# Patient Record
Sex: FEMALE | Race: WHITE | NOT HISPANIC OR LATINO | Employment: FULL TIME | URBAN - METROPOLITAN AREA
[De-identification: names, ages, dates, MRNs, and addresses within clinical notes are randomized per-mention and may not be internally consistent; named-entity substitution may affect disease eponyms.]

---

## 2019-02-13 ENCOUNTER — RECORDS - HEALTHEAST (OUTPATIENT)
Dept: LAB | Facility: CLINIC | Age: 59
End: 2019-02-13

## 2019-02-13 LAB
ANION GAP SERPL CALCULATED.3IONS-SCNC: 11 MMOL/L (ref 5–18)
BUN SERPL-MCNC: 13 MG/DL (ref 8–22)
CALCIUM SERPL-MCNC: 10.8 MG/DL (ref 8.5–10.5)
CHLORIDE BLD-SCNC: 100 MMOL/L (ref 98–107)
CHOLEST SERPL-MCNC: 169 MG/DL
CO2 SERPL-SCNC: 29 MMOL/L (ref 22–31)
CREAT SERPL-MCNC: 0.76 MG/DL (ref 0.6–1.1)
CREAT UR-MCNC: 39.4 MG/DL
FASTING STATUS PATIENT QL REPORTED: YES
GFR SERPL CREATININE-BSD FRML MDRD: >60 ML/MIN/1.73M2
GLUCOSE BLD-MCNC: 147 MG/DL (ref 70–125)
HDLC SERPL-MCNC: 50 MG/DL
LDLC SERPL CALC-MCNC: 88 MG/DL
MICROALBUMIN UR-MCNC: 10.5 MG/DL (ref 0–1.99)
MICROALBUMIN/CREAT UR: 266.5 MG/G
POTASSIUM BLD-SCNC: 4.5 MMOL/L (ref 3.5–5)
SODIUM SERPL-SCNC: 140 MMOL/L (ref 136–145)
TRIGL SERPL-MCNC: 154 MG/DL

## 2019-02-14 LAB
HPV SOURCE: NORMAL
HUMAN PAPILLOMA VIRUS 16 DNA: NEGATIVE
HUMAN PAPILLOMA VIRUS 18 DNA: NEGATIVE
HUMAN PAPILLOMA VIRUS FINAL DIAGNOSIS: NORMAL
HUMAN PAPILLOMA VIRUS OTHER HR: NEGATIVE
SPECIMEN DESCRIPTION: NORMAL

## 2019-04-26 ENCOUNTER — RECORDS - HEALTHEAST (OUTPATIENT)
Dept: LAB | Facility: CLINIC | Age: 59
End: 2019-04-26

## 2019-04-26 LAB
CREAT UR-MCNC: 61.5 MG/DL
MICROALBUMIN UR-MCNC: 5.33 MG/DL (ref 0–1.99)
MICROALBUMIN/CREAT UR: 86.7 MG/G

## 2020-04-01 ENCOUNTER — RECORDS - HEALTHEAST (OUTPATIENT)
Dept: LAB | Facility: CLINIC | Age: 60
End: 2020-04-01

## 2020-04-01 LAB
ANION GAP SERPL CALCULATED.3IONS-SCNC: 13 MMOL/L (ref 5–18)
BUN SERPL-MCNC: 15 MG/DL (ref 8–22)
CALCIUM SERPL-MCNC: 9.5 MG/DL (ref 8.5–10.5)
CHLORIDE BLD-SCNC: 103 MMOL/L (ref 98–107)
CHOLEST SERPL-MCNC: 141 MG/DL
CO2 SERPL-SCNC: 24 MMOL/L (ref 22–31)
CREAT SERPL-MCNC: 0.81 MG/DL (ref 0.6–1.1)
FASTING STATUS PATIENT QL REPORTED: YES
GFR SERPL CREATININE-BSD FRML MDRD: >60 ML/MIN/1.73M2
GLUCOSE BLD-MCNC: 163 MG/DL (ref 70–125)
HDLC SERPL-MCNC: 38 MG/DL
LDLC SERPL CALC-MCNC: 63 MG/DL
POTASSIUM BLD-SCNC: 4.8 MMOL/L (ref 3.5–5)
SODIUM SERPL-SCNC: 140 MMOL/L (ref 136–145)
TRIGL SERPL-MCNC: 201 MG/DL

## 2021-05-26 ENCOUNTER — RECORDS - HEALTHEAST (OUTPATIENT)
Dept: ADMINISTRATIVE | Facility: CLINIC | Age: 61
End: 2021-05-26

## 2021-05-28 ENCOUNTER — RECORDS - HEALTHEAST (OUTPATIENT)
Dept: ADMINISTRATIVE | Facility: CLINIC | Age: 61
End: 2021-05-28

## 2021-08-23 ENCOUNTER — LAB REQUISITION (OUTPATIENT)
Dept: LAB | Facility: CLINIC | Age: 61
End: 2021-08-23
Payer: COMMERCIAL

## 2021-08-23 DIAGNOSIS — Z20.822 CONTACT WITH AND (SUSPECTED) EXPOSURE TO COVID-19: ICD-10-CM

## 2021-08-23 PROCEDURE — U0003 INFECTIOUS AGENT DETECTION BY NUCLEIC ACID (DNA OR RNA); SEVERE ACUTE RESPIRATORY SYNDROME CORONAVIRUS 2 (SARS-COV-2) (CORONAVIRUS DISEASE [COVID-19]), AMPLIFIED PROBE TECHNIQUE, MAKING USE OF HIGH THROUGHPUT TECHNOLOGIES AS DESCRIBED BY CMS-2020-01-R: HCPCS | Mod: ORL | Performed by: PHYSICIAN ASSISTANT

## 2021-08-24 LAB — SARS-COV-2 RNA RESP QL NAA+PROBE: NEGATIVE

## 2021-11-03 ENCOUNTER — LAB REQUISITION (OUTPATIENT)
Dept: LAB | Facility: CLINIC | Age: 61
End: 2021-11-03
Payer: COMMERCIAL

## 2021-11-03 DIAGNOSIS — E78.5 HYPERLIPIDEMIA, UNSPECIFIED: ICD-10-CM

## 2021-11-03 DIAGNOSIS — I10 ESSENTIAL (PRIMARY) HYPERTENSION: ICD-10-CM

## 2021-11-03 LAB
ANION GAP SERPL CALCULATED.3IONS-SCNC: 12 MMOL/L (ref 5–18)
BUN SERPL-MCNC: 10 MG/DL (ref 8–22)
CALCIUM SERPL-MCNC: 9.6 MG/DL (ref 8.5–10.5)
CHLORIDE BLD-SCNC: 102 MMOL/L (ref 98–107)
CHOLEST SERPL-MCNC: 136 MG/DL
CO2 SERPL-SCNC: 26 MMOL/L (ref 22–31)
CREAT SERPL-MCNC: 0.77 MG/DL (ref 0.6–1.1)
GFR SERPL CREATININE-BSD FRML MDRD: 85 ML/MIN/1.73M2
GLUCOSE BLD-MCNC: 141 MG/DL (ref 70–125)
HDLC SERPL-MCNC: 40 MG/DL
LDLC SERPL CALC-MCNC: 56 MG/DL
POTASSIUM BLD-SCNC: 4.3 MMOL/L (ref 3.5–5)
SODIUM SERPL-SCNC: 140 MMOL/L (ref 136–145)
TRIGL SERPL-MCNC: 198 MG/DL

## 2021-11-03 PROCEDURE — 80048 BASIC METABOLIC PNL TOTAL CA: CPT | Mod: ORL | Performed by: FAMILY MEDICINE

## 2021-11-03 PROCEDURE — 80061 LIPID PANEL: CPT | Mod: ORL | Performed by: FAMILY MEDICINE

## 2022-03-03 ENCOUNTER — LAB REQUISITION (OUTPATIENT)
Dept: LAB | Facility: CLINIC | Age: 62
End: 2022-03-03
Payer: COMMERCIAL

## 2022-03-03 DIAGNOSIS — Z01.818 ENCOUNTER FOR OTHER PREPROCEDURAL EXAMINATION: ICD-10-CM

## 2022-03-03 LAB
ANION GAP SERPL CALCULATED.3IONS-SCNC: 13 MMOL/L (ref 5–18)
BUN SERPL-MCNC: 9 MG/DL (ref 8–22)
CALCIUM SERPL-MCNC: 9.4 MG/DL (ref 8.5–10.5)
CHLORIDE BLD-SCNC: 103 MMOL/L (ref 98–107)
CO2 SERPL-SCNC: 24 MMOL/L (ref 22–31)
CREAT SERPL-MCNC: 0.77 MG/DL (ref 0.6–1.1)
GFR SERPL CREATININE-BSD FRML MDRD: 88 ML/MIN/1.73M2
GLUCOSE BLD-MCNC: 178 MG/DL (ref 70–125)
POTASSIUM BLD-SCNC: 4.5 MMOL/L (ref 3.5–5)
SODIUM SERPL-SCNC: 140 MMOL/L (ref 136–145)

## 2022-03-03 PROCEDURE — 80048 BASIC METABOLIC PNL TOTAL CA: CPT | Mod: ORL | Performed by: FAMILY MEDICINE

## 2023-02-20 ENCOUNTER — LAB REQUISITION (OUTPATIENT)
Dept: LAB | Facility: CLINIC | Age: 63
End: 2023-02-20

## 2023-02-20 DIAGNOSIS — E78.5 HYPERLIPIDEMIA, UNSPECIFIED: ICD-10-CM

## 2023-02-20 DIAGNOSIS — I10 ESSENTIAL (PRIMARY) HYPERTENSION: ICD-10-CM

## 2023-02-20 LAB
ANION GAP SERPL CALCULATED.3IONS-SCNC: 17 MMOL/L (ref 7–15)
BUN SERPL-MCNC: 15.7 MG/DL (ref 8–23)
CALCIUM SERPL-MCNC: 9.7 MG/DL (ref 8.8–10.2)
CHLORIDE SERPL-SCNC: 100 MMOL/L (ref 98–107)
CHOLEST SERPL-MCNC: 152 MG/DL
CREAT SERPL-MCNC: 0.75 MG/DL (ref 0.51–0.95)
DEPRECATED HCO3 PLAS-SCNC: 22 MMOL/L (ref 22–29)
GFR SERPL CREATININE-BSD FRML MDRD: >90 ML/MIN/1.73M2
GLUCOSE SERPL-MCNC: 158 MG/DL (ref 70–99)
HDLC SERPL-MCNC: 41 MG/DL
LDLC SERPL CALC-MCNC: 65 MG/DL
NONHDLC SERPL-MCNC: 111 MG/DL
POTASSIUM SERPL-SCNC: 4.6 MMOL/L (ref 3.4–5.3)
SODIUM SERPL-SCNC: 139 MMOL/L (ref 136–145)
TRIGL SERPL-MCNC: 229 MG/DL

## 2023-02-20 PROCEDURE — 80048 BASIC METABOLIC PNL TOTAL CA: CPT | Mod: ORL | Performed by: STUDENT IN AN ORGANIZED HEALTH CARE EDUCATION/TRAINING PROGRAM

## 2023-02-20 PROCEDURE — 80061 LIPID PANEL: CPT | Mod: ORL | Performed by: STUDENT IN AN ORGANIZED HEALTH CARE EDUCATION/TRAINING PROGRAM

## 2023-03-03 ENCOUNTER — OFFICE VISIT (OUTPATIENT)
Dept: URGENT CARE | Facility: URGENT CARE | Age: 63
End: 2023-03-03
Payer: COMMERCIAL

## 2023-03-03 VITALS
DIASTOLIC BLOOD PRESSURE: 93 MMHG | BODY MASS INDEX: 29.18 KG/M2 | RESPIRATION RATE: 17 BRPM | SYSTOLIC BLOOD PRESSURE: 137 MMHG | WEIGHT: 170 LBS | OXYGEN SATURATION: 95 % | TEMPERATURE: 97.8 F | HEART RATE: 101 BPM

## 2023-03-03 DIAGNOSIS — R11.2 NAUSEA AND VOMITING, UNSPECIFIED VOMITING TYPE: Primary | ICD-10-CM

## 2023-03-03 PROCEDURE — 99203 OFFICE O/P NEW LOW 30 MIN: CPT | Performed by: EMERGENCY MEDICINE

## 2023-03-03 RX ORDER — LISINOPRIL 40 MG/1
40 TABLET ORAL DAILY
COMMUNITY
Start: 2022-05-21

## 2023-03-03 RX ORDER — VENLAFAXINE 37.5 MG/1
3 TABLET ORAL DAILY
COMMUNITY
Start: 2022-06-13

## 2023-03-03 RX ORDER — ONDANSETRON 4 MG/1
4 TABLET, ORALLY DISINTEGRATING ORAL ONCE
Status: COMPLETED | OUTPATIENT
Start: 2023-03-03 | End: 2023-03-03

## 2023-03-03 RX ORDER — METFORMIN HCL 500 MG
2000 TABLET, EXTENDED RELEASE 24 HR ORAL
COMMUNITY

## 2023-03-03 RX ORDER — ONDANSETRON 4 MG/1
4 TABLET, ORALLY DISINTEGRATING ORAL EVERY 12 HOURS PRN
Qty: 12 TABLET | Refills: 0 | Status: SHIPPED | OUTPATIENT
Start: 2023-03-03 | End: 2024-05-01

## 2023-03-03 RX ORDER — ATORVASTATIN CALCIUM 80 MG/1
80 TABLET, FILM COATED ORAL
COMMUNITY
Start: 2022-06-21 | End: 2024-06-14

## 2023-03-03 RX ADMIN — ONDANSETRON 4 MG: 4 TABLET, ORALLY DISINTEGRATING ORAL at 17:12

## 2023-03-03 NOTE — PROGRESS NOTES
Assessment & Plan     Diagnosis:  (R11.2) Nausea and vomiting, unspecified vomiting type  (primary encounter diagnosis)  Plan: ondansetron (ZOFRAN ODT) ODT tab 4 mg      Medical Decision Making:  Sonia Montana is a 62 year old female who presents for evaluation of nausea and vomiting following use of Hydrocodone yesterday for post-operative pain (patient had a bilateral blepharoplasty).  Patient has not vomited for about two hours at this juncture and was given Zofran here to help prevent recurrent emesis.  No dark or bloody stools.  She has completely benign abdominal exam and surgical incisions appear to be well-healing with no drainage or signs of infection.  Suspect opiate induced nausea and vomiting, discussed avoiding this medication if she can tolerate the pain with Tylenol/Ibuprofen and use Zofran as needed for nausea.  Go to the ER for intractable vomiting, severe chest/abdominal pain, fevers, new vision changes or other concerns.    Patient voices understanding and agreement with the plan including reasons to go to the ER immediately as well as to be seen by a more consistent care-giver, such as their PCP, if the symptoms persist more than 3 days.     ABIMAEL Victor [facility] URGENT CARE    Subjective     Sonia Montana is a 62 year old female who presents to clinic today for the following health issues:  Chief Complaint   Patient presents with     Urgent Care     Pt had surgery in eyes, took hydrocodone     Vomiting     Pt has thrown up 4/5 times since this morning, last time was about an hour ago       HPI  Patient notes that she had a blepharoplasty yesterday and after taking hydrocodone 2 times yesterday she had 4-5 episodes of vomiting, was still feeling nauseated this morning even though her last dose was last night.  She has not thrown up for the last couple of hours, but states she does feel intermittently feels nauseated and feels like she throws up quickly after the nausea comes  on.  Denies any black or bloody emesis, diarrhea, abdominal pain, chest pain, shortness of breath, double vision, fevers or other concerns.      Review of Systems    See HPI    Objective      Vitals: BP (!) 137/93 (BP Location: Right arm, Patient Position: Sitting, Cuff Size: Adult Regular)   Pulse 101   Temp 97.8  F (36.6  C) (Temporal)   Resp 17   Wt 77.1 kg (170 lb)   SpO2 95%   BMI 29.18 kg/m      Patient Vitals for the past 24 hrs:   BP Temp Temp src Pulse Resp SpO2 Weight   03/03/23 1703 (!) 137/93 97.8  F (36.6  C) Temporal 101 17 95 % 77.1 kg (170 lb)       Vital signs reviewed by: Tye Wesley PA-C    Physical Exam   Constitutional: Patient is alert and cooperative. No acute distress.  Mouth: Mucous membranes are moist. Normal tongue and tonsil. Posterior oropharynx is clear.  Eyes: Conjunctivae, EOMI are normal. PERRL. Periorbital ecchymosis and swelling to the upper eyelids with scant dried blood across the tops.  Incisions appear well-healing.  No erythema, warmth or purulent drainage noted.  Cardiovascular: Regular rate and rhythm  Pulmonary/Chest: Lungs are clear to auscultation throughout. Effort normal. No respiratory distress. No wheezes, rales or rhonchi.  GI: Abdomen is soft and non-tender throughout. No CVA tenderness bilaterally.  Neurological: Alert and oriented x3.   Skin: No rash noted on visualized skin.  Psychiatric:The patient has a normal mood and affect.       Interventions:  Zofran 4 mg PO    Tye Wesley PA-C, March 3, 2023

## 2024-03-29 ENCOUNTER — LAB REQUISITION (OUTPATIENT)
Dept: LAB | Facility: CLINIC | Age: 64
End: 2024-03-29
Payer: COMMERCIAL

## 2024-03-29 DIAGNOSIS — E11.9 TYPE 2 DIABETES MELLITUS WITHOUT COMPLICATIONS (H): ICD-10-CM

## 2024-03-29 DIAGNOSIS — Z12.4 ENCOUNTER FOR SCREENING FOR MALIGNANT NEOPLASM OF CERVIX: ICD-10-CM

## 2024-03-29 DIAGNOSIS — I10 ESSENTIAL (PRIMARY) HYPERTENSION: ICD-10-CM

## 2024-03-29 DIAGNOSIS — E78.5 HYPERLIPIDEMIA, UNSPECIFIED: ICD-10-CM

## 2024-03-29 LAB
ANION GAP SERPL CALCULATED.3IONS-SCNC: 17 MMOL/L (ref 7–15)
BUN SERPL-MCNC: 11.1 MG/DL (ref 8–23)
CALCIUM SERPL-MCNC: 9.7 MG/DL (ref 8.8–10.2)
CHLORIDE SERPL-SCNC: 101 MMOL/L (ref 98–107)
CHOLEST SERPL-MCNC: 146 MG/DL
CREAT SERPL-MCNC: 0.72 MG/DL (ref 0.51–0.95)
CREAT UR-MCNC: 48.2 MG/DL
DEPRECATED HCO3 PLAS-SCNC: 21 MMOL/L (ref 22–29)
EGFRCR SERPLBLD CKD-EPI 2021: >90 ML/MIN/1.73M2
FASTING STATUS PATIENT QL REPORTED: ABNORMAL
GLUCOSE SERPL-MCNC: 156 MG/DL (ref 70–99)
HDLC SERPL-MCNC: 41 MG/DL
LDLC SERPL CALC-MCNC: 62 MG/DL
MICROALBUMIN UR-MCNC: 12.6 MG/L
MICROALBUMIN/CREAT UR: 26.14 MG/G CR (ref 0–25)
NONHDLC SERPL-MCNC: 105 MG/DL
POTASSIUM SERPL-SCNC: 4.6 MMOL/L (ref 3.4–5.3)
SODIUM SERPL-SCNC: 139 MMOL/L (ref 135–145)
TRIGL SERPL-MCNC: 213 MG/DL

## 2024-03-29 PROCEDURE — 80061 LIPID PANEL: CPT | Mod: ORL | Performed by: FAMILY MEDICINE

## 2024-03-29 PROCEDURE — 87624 HPV HI-RISK TYP POOLED RSLT: CPT | Mod: ORL | Performed by: FAMILY MEDICINE

## 2024-03-29 PROCEDURE — 82570 ASSAY OF URINE CREATININE: CPT | Mod: ORL | Performed by: FAMILY MEDICINE

## 2024-03-29 PROCEDURE — G0145 SCR C/V CYTO,THINLAYER,RESCR: HCPCS | Mod: ORL | Performed by: FAMILY MEDICINE

## 2024-03-29 PROCEDURE — 80048 BASIC METABOLIC PNL TOTAL CA: CPT | Mod: ORL | Performed by: FAMILY MEDICINE

## 2024-04-02 LAB
BKR LAB AP GYN ADEQUACY: NORMAL
BKR LAB AP GYN INTERPRETATION: NORMAL
BKR LAB AP HPV REFLEX: NORMAL
BKR LAB AP PREVIOUS ABNORMAL: NORMAL
PATH REPORT.COMMENTS IMP SPEC: NORMAL
PATH REPORT.COMMENTS IMP SPEC: NORMAL
PATH REPORT.RELEVANT HX SPEC: NORMAL

## 2024-04-04 LAB
HUMAN PAPILLOMA VIRUS 16 DNA: NEGATIVE
HUMAN PAPILLOMA VIRUS 18 DNA: NEGATIVE
HUMAN PAPILLOMA VIRUS FINAL DIAGNOSIS: NORMAL
HUMAN PAPILLOMA VIRUS OTHER HR: NEGATIVE

## 2024-04-30 NOTE — PROGRESS NOTES
Medication Therapy Management (MTM) Encounter    ASSESSMENT:                            Medication Adherence/Access: No issues identified. Timing of meds reviewed in detail.    Diabetes:   Patient is not meeting A1c goal of < 7%.  Self monitoring of blood glucose is not at goal of fasting  mg/dL.  Patient would benefit from additional therapy - GLP-1 RA for improved blood sugar control, weight loss, and cardiorenal benefits given hx of stroke. Additionally, reducing metformin to 4 whole tablets daily and eliminating the XR tablet cut in half may reduce GI side effects.     GERD    Stable.    Hypertension   Stable. Will need close monitoring with anticipated weight loss from Ozempic -- may be able to reduce or taper off amlodipine in the near future.    Hyperlipidemia   Stable.    Depression  Stable.      PLAN:                            1. Reduce metformin to 2 tablets twice a day. This will likely help to improve diarrhea.    2. Start Ozempic 0.25mg injected once per week. This medication helps your insulin to stay around longer to do its job, decreases appetite, and slows how you absorb sugar into your blood.  It is generally very effective in lowering blood sugar and helping with weight loss. There are some heart and kidney protective benefits long-term. Side effects we monitor for include nausea, vomiting, bloating, constipation or diarrhea, and abdominal pain.    3. I sent in a prescription for Freestyle Augie 3 sensors to the pharmacy. You will need to download the Freestyle Augie 3 lauri to your phone. This monitors blood sugar levels at all times.    4. You can take all of your medicines together in the evening for more convenience. We discussed leaving the venlfaxine as is, since it works well.      Follow-up: Return in 30 days (on 5/31/2024) for Medication dose check, in person, at 8am.    SUBJECTIVE/OBJECTIVE:                          Sonia Montana is a 63 year old female coming in for an initial  visit. She was referred to me from Sierra Leonardo.      Reason for visit: Medication review.    Allergies/ADRs: Reviewed in chart  Past Medical History: Reviewed in chart  Tobacco: She reports that she has never smoked. She has never used smokeless tobacco.  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 Later in the day will run 120-130mg/dL  Current diabetes symptoms: fatigue that comes and goes  Diet/Exercise: snacks on peanuts or crackers  Fruit and veggie and protein every day, yogurt for breakfast  Sugar has been higher over the last year  Cookie or something sweet after dinner most nights  Aims to exercise 3 days per week. But right now, 1 day per week, walks 4 miles with friends on Sundays       GERD    Omeprazole 20 mg 2 capsules daily    Patient reports no current symptoms.   Patient feels that current regimen is effective.  Notices symptoms if she misses a dose -- wakes her up in middle of night       Hypertension   Amlodipine 5mg twice daily  Lisinopril 40mg daily  Patient reports no current medication side effects  Patient self monitors blood pressure.  Home BP monitoring 130/76 .         Hyperlipidemia   Atorvastatin 80mg daily  Patient reports no significant myalgias or other side effects.       Recent Labs   Lab Test 03/29/24  1020 02/20/23  0835   CHOL 146 152   HDL 41* 41*   LDL 62 65   TRIG 213* 229*       Depression  Venlafaxine 37.5mg twice daily, 2 tablets in the morning and 1 in the evening.   Patient reports no current medication side effects.  Patient reports symptoms are stable. Feels this regimen works really well for her, doesn't want to change it.      Today's Vitals: /76   Pulse  106   Wt 170 lb 9.6 oz (77.4 kg)   BMI 29.28 kg/m    ----------------      I spent 60 minutes with this patient today. All changes were made via collaborative practice agreement with Sierra Leonardo MD. A copy of the visit note was provided to the patient's provider(s).    A summary of these recommendations was given to the patient.    Rabia Mejias, Pharm.D., [facility]  Medication Therapy Management Pharmacist  [phone]          Medication Therapy Recommendations  Type 2 diabetes mellitus without complication, without long-term current use of insulin (H)    Current Medication: metFORMIN (GLUCOPHAGE XR) 500 MG 24 hr tablet   Rationale: Undesirable effect - Adverse medication event - Safety   Recommendation: Decrease Dose   Status: Accepted per [facility]          Current Medication: semaglutide (OZEMPIC, 0.25 OR 0.5 MG/DOSE,) 2 MG/3ML pen   Rationale: Synergistic therapy - Needs additional medication therapy - Indication   Recommendation: Start Medication   Status: Accepted per CPA

## 2024-05-01 ENCOUNTER — OFFICE VISIT (OUTPATIENT)
Dept: PHARMACY | Facility: PHYSICIAN GROUP | Age: 64
End: 2024-05-01

## 2024-05-01 VITALS
HEART RATE: 106 BPM | BODY MASS INDEX: 29.28 KG/M2 | DIASTOLIC BLOOD PRESSURE: 76 MMHG | SYSTOLIC BLOOD PRESSURE: 119 MMHG | WEIGHT: 170.6 LBS

## 2024-05-01 DIAGNOSIS — K21.9 GASTROESOPHAGEAL REFLUX DISEASE, UNSPECIFIED WHETHER ESOPHAGITIS PRESENT: ICD-10-CM

## 2024-05-01 DIAGNOSIS — E78.5 HYPERLIPEMIA: ICD-10-CM

## 2024-05-01 DIAGNOSIS — E78.5 HYPERLIPIDEMIA, UNSPECIFIED HYPERLIPIDEMIA TYPE: ICD-10-CM

## 2024-05-01 DIAGNOSIS — F32.A DEPRESSION, UNSPECIFIED DEPRESSION TYPE: ICD-10-CM

## 2024-05-01 DIAGNOSIS — E11.9 TYPE 2 DIABETES MELLITUS WITHOUT COMPLICATION, WITHOUT LONG-TERM CURRENT USE OF INSULIN (H): Primary | ICD-10-CM

## 2024-05-01 DIAGNOSIS — I10 HYPERTENSION, ESSENTIAL: ICD-10-CM

## 2024-05-01 PROCEDURE — 99207 PR NO CHARGE LOS: CPT | Performed by: PHARMACIST

## 2024-05-01 RX ORDER — AMLODIPINE BESYLATE 5 MG/1
10 TABLET ORAL DAILY
COMMUNITY
End: 2024-09-04

## 2024-05-01 RX ORDER — SEMAGLUTIDE 0.68 MG/ML
0.5 INJECTION, SOLUTION SUBCUTANEOUS
COMMUNITY
End: 2024-08-09 | Stop reason: DRUGHIGH

## 2024-05-01 RX ORDER — ASPIRIN 325 MG
325 TABLET, DELAYED RELEASE (ENTERIC COATED) ORAL DAILY
COMMUNITY

## 2024-05-01 NOTE — PATIENT INSTRUCTIONS
Recommendations from today's MTM visit:                                                      1. Reduce metformin to 2 tablets twice a day. This will likely help to improve diarrhea.    2. Start Ozempic 0.25mg injected once per week. This medication helps your insulin to stay around longer to do its job, decreases appetite, and slows how you absorb sugar into your blood.  It is generally very effective in lowering blood sugar and helping with weight loss. There are some heart and kidney protective benefits long-term. Side effects we monitor for include nausea, vomiting, bloating, constipation or diarrhea, and abdominal pain.    3. I sent in a prescription for Freestyle Augie 3 sensors to the pharmacy. You will need to download the Freestyle Augie 3 lauri to your phone. This monitors blood sugar levels at all times.    4. You can take all of your medicines together in the evening for more convenience. We discussed leaving the venlfaxine as is, since it works well.      Follow-up: Return in 30 days (on 5/31/2024) for Medication dose check, in person, at 8am.    It was great to speak with you today.  I value your experience and would be very thankful for your time with providing feedback on our clinic survey. You may receive a survey via email or text message in the next few days.     To schedule another MTM appointment, please call the clinic directly or you may call the scheduling line at [phone].    My Clinical Pharmacist's contact information:                                                      Please feel free to contact me with any questions or concerns you have.      Rabia Mejias, Pharm.D., [facility]  Medication Therapy Management Pharmacist  [phone]

## 2024-06-14 ENCOUNTER — OFFICE VISIT (OUTPATIENT)
Dept: PHARMACY | Facility: PHYSICIAN GROUP | Age: 64
End: 2024-06-14
Payer: COMMERCIAL

## 2024-06-14 VITALS
BODY MASS INDEX: 28.29 KG/M2 | SYSTOLIC BLOOD PRESSURE: 118 MMHG | HEART RATE: 98 BPM | DIASTOLIC BLOOD PRESSURE: 76 MMHG | WEIGHT: 164.8 LBS

## 2024-06-14 DIAGNOSIS — E11.9 TYPE 2 DIABETES MELLITUS WITHOUT COMPLICATION, WITHOUT LONG-TERM CURRENT USE OF INSULIN (H): Primary | ICD-10-CM

## 2024-06-14 DIAGNOSIS — I10 HYPERTENSION, ESSENTIAL: ICD-10-CM

## 2024-06-14 DIAGNOSIS — E78.5 HYPERLIPIDEMIA, UNSPECIFIED HYPERLIPIDEMIA TYPE: ICD-10-CM

## 2024-06-14 PROCEDURE — 99207 PR NO CHARGE LOS: CPT | Performed by: PHARMACIST

## 2024-06-14 NOTE — PATIENT INSTRUCTIONS
Recommendations from today's MTM visit:                                                      ASSESSMENT:                            Medication Adherence/Access: No issues identified. Timing of meds reviewed in detail.    Diabetes:   Patient is not meeting A1c goal of < 7%.  Self monitoring of blood glucose is not at goal of fasting  mg/dL.  Patient would benefit from additional therapy - GLP-1 RA for improved blood sugar control, weight loss, and cardiorenal benefits given hx of stroke. Additionally, reducing metformin to 4 whole tablets daily and eliminating the XR tablet cut in half may reduce GI side effects.    Hypertension:   Stable. Patient is meeting blood pressure goal of < 130/80mmHg.    Hyperlipidemia:   Stable.      PLAN:                            1. Double check low blood sugar levels middle of night with a finger poke. If it is truly low, we should consider reducing your metformin dose.    2. Continue on Ozempic 0.5mg injected weekly for the next 5 weeks. Most likely, we will be able to increase to 1mg at your upcoming visit with Dr. Leonardo.    3. If you get another Augie sensor that doesn't work, call the number on the box and they will likely replace it for free.      Follow-up: Return in 7 weeks (on 8/2/2024) for Blood sugar recheck, Medication dose check.    It was great to speak with you today.  I value your experience and would be very thankful for your time with providing feedback on our clinic survey. You may receive a survey via email or text message in the next few days.     To schedule another MTM appointment, please call the clinic directly or you may call the scheduling line at [phone].    My Clinical Pharmacist's contact information:                                                      Please feel free to contact me with any questions or concerns you have.      Rabia Mejias, Pharm.D., [facility]  Medication Therapy Management Pharmacist  [phone]

## 2024-06-14 NOTE — PROGRESS NOTES
Medication Therapy Management (MTM) Encounter    ASSESSMENT:                            Medication Adherence/Access: No issues identified. Timing of meds reviewed in detail.    Diabetes:   Patient is not meeting A1c goal of < 7%. Will be checked at upcoming primary care provider visit  Patient is meeting goal of > 70% time in target with continuous glucose monitoring.   Patient would benefit from continuing Ozempic titration. Double check lows with finger poke. May need to consider metformin reduction if truly low.    Hypertension:   Stable. Patient is meeting blood pressure goal of < 130/80mmHg.    Hyperlipidemia:   Stable.      PLAN:                            1. Double check low blood sugar levels middle of night with a finger poke. If it is truly low, we should consider reducing your metformin dose.    2. Continue on Ozempic 0.5mg injected weekly for the next 5 weeks. Most likely, we will be able to increase to 1mg at your upcoming visit with Dr. Leonardo.    3. If you get another Augie sensor that doesn't work, call the number on the box and they will likely replace it for free.      Follow-up: Return in 7 weeks (on 8/2/2024) for Blood sugar recheck, Medication dose check.    SUBJECTIVE/OBJECTIVE:                          Sonia Montana is a 63 year old female seen for a follow-up visit.      Reason for visit: Medication review.    Allergies/ADRs: Reviewed in chart  Past Medical History: Reviewed in chart  Tobacco: She reports that she has never smoked. She has never used smokeless tobacco.  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   Name: Sonia Montana  YOB: 1960  Report Period: 05/18/2024 - 06/14/2024 (28 days)  Generated: 06/14/2024  % Time CGM Active: 47%  -----------------------------------------  Glucose Statistics and Targets  Average Glucose: 138 mg/dL  Glucose Management Indicator (GMI): 6.6%  Glucose Variability (%CV): 25.9%  Target Range: 70 - 180 mg/dL  -----------------------------------------  Time in Ranges  Very High: >250 mg/dL --- 1%  High: 181 - 250 mg/dL --- 12%  Target Range: 70 - 180 mg/dL --- 87%  Low: 54 - 69 mg/dL --- 0%  Very Low: <54 mg/dL --- 0%    Diet/Exercise: snacks on peanuts or crackers  Fruit and veggie and protein every day, yogurt for breakfast  Aims to exercise 3 days per week. But right now, 1 day per week, walks 4 miles with friends on Sundays       Hypertension   Amlodipine 5mg twice daily  Lisinopril 40mg daily  Patient reports no current medication side effects  Patient self monitors blood pressure.  Home BP monitoring 130/76 .         Hyperlipidemia   Atorvastatin 80mg daily  Patient reports no significant myalgias or other side effects.     Recent Labs   Lab Test 03/29/24  1020 02/20/23  0835   CHOL 146 152   HDL 41* 41*   LDL 62 65   TRIG 213* 229*     Today's Vitals: /76   Pulse 98   Wt 164 lb 12.8 oz (74.8 kg)   BMI 28.29 kg/m    ----------------      I spent 30 minutes with this patient today. All changes were made via collaborative practice agreement with Sierra Leonardo MD. A copy of the visit note was provided to the patient's provider(s).    A summary of these recommendations was declined by the patient.    Rabia Mejias, Pharm.D., [facility]  Medication Therapy Management Pharmacist  [phone]          Medication Therapy Recommendations  No medication therapy recommendations to display

## 2024-07-01 ENCOUNTER — TRANSFERRED RECORDS (OUTPATIENT)
Dept: HEALTH INFORMATION MANAGEMENT | Facility: CLINIC | Age: 64
End: 2024-07-01

## 2024-07-01 LAB — HBA1C MFR BLD: 6.7 % (ref 4.2–6.1)

## 2024-08-08 NOTE — PROGRESS NOTES
Medication Therapy Management (MTM) Encounter    ASSESSMENT:                            Medication Adherence/Access: No issues identified. Timing of meds reviewed in detail.    Diabetes:   Patient is meeting A1c goal of < 7%.   Patient is meeting goal of > 70% time in target with continuous glucose monitoring.   Patient would benefit from continuing Ozempic titration for further weight loss and cardiorenal benefits.     Hypertension:   Stable. Patient is meeting blood pressure goal of < 130/80mmHg. Will continue to monitor as it comes down and may be able to reduce meds at next visit.    Hyperlipidemia:   Stable.      PLAN:                            1. Increase Ozempic to 2mg injected once per week. We'll recheck blood sugar and blood pressure levels in 1 month and see if we can reduce any other medicines!    Follow-up: Return in 26 days (on 9/4/2024) for Medication dose check, at 3:30pm.    SUBJECTIVE/OBJECTIVE:                          Sonia Montana is a 64 year old female seen for a follow-up visit.      Reason for visit: Medication review.    Allergies/ADRs: Reviewed in chart  Past Medical History: Reviewed in chart  Tobacco: She reports that she has never smoked. She has never used smokeless tobacco.  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 Leonardo and has taken 3 or 4 doses. She did have to hold medication x2 weeks prior to colonoscopy. Noticed blood sugars went up during that time. Is back on, and no issues or side effects.  Aspirin 325mg daily    Current diabetes symptoms: none reported, feeling great  Blood sugar monitoring: Augie Gaitan   Name: Sonia Montana  YOB: 1960  Report Period: 07/12/2024 - 08/08/2024 (28 days)  Generated: 08/08/2024  % Time CGM Active: 55%    Average Glucose: 125  mg/dL  Glucose Management Indicator (GMI): 6.3%  Glucose Variability (%CV): 22.4%    Time in Ranges  Very High: >250 mg/dL --- 1%  High: 181 - 250 mg/dL --- 3%  Target Range: 70 - 180 mg/dL --- 96% -- improved from 87% last visit  Low: 54 - 69 mg/dL --- 0%  Very Low: <54 mg/dL --- 0%      Diet/Exercise: snacks on peanuts or crackers  Fruit and veggie and protein every day, yogurt for breakfast  Aims to exercise 3 days per week. But right now, 1 day per week, walks 4 miles with friends on Sundays    Abstracted 08/08/24        Hypertension   Amlodipine 5mg twice daily  Lisinopril 40mg daily  Patient reports no current medication side effects. Specifically denies dizziness, LHness  Patient self monitors blood pressure.  Home BP monitoring 110/78 .         Hyperlipidemia   Atorvastatin 80mg daily  Patient reports no significant myalgias or other side effects.     Recent Labs   Lab Test 03/29/24  1020 02/20/23  0835   CHOL 146 152   HDL 41* 41*   LDL 62 65   TRIG 213* 229*     Today's Vitals: /64   Wt 155 lb 9.6 oz (70.6 kg)   BMI 26.71 kg/m    ----------------      I spent 30 minutes with this patient today. All changes were made via collaborative practice agreement with Sierra Leonardo MD. A copy of the visit note was provided to the patient's provider(s).    A summary of these recommendations was declined by the patient.    Rabia Mejias, Pharm.D., [facility]  Medication Therapy Management Pharmacist  [phone]          Medication Therapy Recommendations  Type 2 diabetes mellitus without complication, without long-term current use of insulin (H)    Current Medication: semaglutide (OZEMPIC, 0.25 OR 0.5 MG/DOSE,) 2 MG/3ML pen (Discontinued)   Rationale: Dose too low - Dosage too low - Effectiveness   Recommendation: Increase Dose - Ozempic (2 MG/DOSE) 8 MG/3ML Sopn   Status: Accepted per CPA

## 2024-08-09 ENCOUNTER — OFFICE VISIT (OUTPATIENT)
Dept: PHARMACY | Facility: PHYSICIAN GROUP | Age: 64
End: 2024-08-09
Payer: COMMERCIAL

## 2024-08-09 VITALS — DIASTOLIC BLOOD PRESSURE: 64 MMHG | WEIGHT: 155.6 LBS | SYSTOLIC BLOOD PRESSURE: 110 MMHG | BODY MASS INDEX: 26.71 KG/M2

## 2024-08-09 DIAGNOSIS — E11.9 TYPE 2 DIABETES MELLITUS WITHOUT COMPLICATION, WITHOUT LONG-TERM CURRENT USE OF INSULIN (H): Primary | ICD-10-CM

## 2024-08-09 DIAGNOSIS — I10 HYPERTENSION, ESSENTIAL: ICD-10-CM

## 2024-08-09 DIAGNOSIS — E78.5 HYPERLIPIDEMIA, UNSPECIFIED HYPERLIPIDEMIA TYPE: ICD-10-CM

## 2024-08-09 PROCEDURE — 99207 PR NO CHARGE LOS: CPT | Performed by: PHARMACIST

## 2024-08-09 RX ORDER — SEMAGLUTIDE 2.68 MG/ML
2 INJECTION, SOLUTION SUBCUTANEOUS
COMMUNITY

## 2024-08-09 NOTE — PATIENT INSTRUCTIONS
Recommendations from today's MTM visit:                                                      1. Increase Ozempic to 2mg injected once per week. We'll recheck blood sugar and blood pressure levels in 1 month and see if we can reduce any other medicines!    Follow-up: Return in 26 days (on 9/4/2024) for Medication dose check, at 3:30pm.    It was great to speak with you today.  I value your experience and would be very thankful for your time with providing feedback on our clinic survey. You may receive a survey via email or text message in the next few days.     To schedule another MTM appointment, please call the clinic directly or you may call the scheduling line at [phone].    My Clinical Pharmacist's contact information:                                                      Please feel free to contact me with any questions or concerns you have.      Rabia Mejias, Pharm.D., [facility]  Medication Therapy Management Pharmacist  [phone]

## 2024-09-04 ENCOUNTER — OFFICE VISIT (OUTPATIENT)
Dept: PHARMACY | Facility: PHYSICIAN GROUP | Age: 64
End: 2024-09-04
Payer: COMMERCIAL

## 2024-09-04 VITALS — SYSTOLIC BLOOD PRESSURE: 90 MMHG | WEIGHT: 147.4 LBS | BODY MASS INDEX: 25.3 KG/M2 | DIASTOLIC BLOOD PRESSURE: 64 MMHG

## 2024-09-04 DIAGNOSIS — I10 HYPERTENSION, ESSENTIAL: ICD-10-CM

## 2024-09-04 DIAGNOSIS — E11.9 TYPE 2 DIABETES MELLITUS WITHOUT COMPLICATION, WITHOUT LONG-TERM CURRENT USE OF INSULIN (H): Primary | ICD-10-CM

## 2024-09-04 PROCEDURE — 99207 PR NO CHARGE LOS: CPT | Performed by: PHARMACIST

## 2024-09-04 NOTE — PROGRESS NOTES
Medication Therapy Management (MTM) Encounter    ASSESSMENT:                            Medication Adherence/Access: No issues identified. Timing of meds reviewed in detail.    Diabetes:   Patient is meeting A1c goal of < 7%.   Patient is meeting goal of > 70% time in target with continuous glucose monitoring.   She may benefit from manual blood sugar check with continuous glucose monitor readings under 70mg/dL. If experiencing true lows, may need to consider reducing dose of metformin. May also need to reduce Ozempic dose if nausea doesn't improve.    Hypertension:   Patient is significantly below blood pressure goal of < 130/80mmHg and experiencing symptoms of hypotension. May be due to consistent weight loss since starting Ozempic. Will discontinue amlodipine and continue to monitor BP closely.    PLAN:                            1. Stop amlodipine completely.    2. We'll give the ozempic 2mg more time -- the nausea may improve over the next few weeks. If not, we can reduce the dose.    Follow-up: Return in 1 month (on 10/4/2024) for Blood sugar recheck, BP Recheck, at 3pm.    SUBJECTIVE/OBJECTIVE:                          Sonia Montana is a 64 year old female seen for a follow-up visit.      Reason for visit: Medication review.    Allergies/ADRs: Reviewed in chart  Past Medical History: Reviewed in chart  Tobacco: She reports that she has never smoked. She has never used smokeless tobacco.  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 Notes daily/constant nausea after injection that improves by Thursday   Aspirin 325mg daily    Current diabetes symptoms: none reported, no symptoms of hypoglycemia despite some early AM readings <70mg/dL  Blood sugar monitoring: Augie 3   Name:  Sonia Montana  YOB: 1960  Report Period: 08/08/2024 - 09/04/2024 (28 days)  Generated: 09/04/2024    % Time CGM Active: 74%  Glucose Statistics and Targets  Average Glucose: 114 mg/dL  Glucose Management Indicator (GMI): 6.0%  Glucose Variability (%CV): 18.4%    Target Range: 70 - 180 mg/dL  Time in Ranges  Very High: >250 mg/dL --- 0%  High: 181 - 250 mg/dL --- 1%  Target Range: 70 - 180 mg/dL --- 98%  Low: 54 - 69 mg/dL --- 1%  Very Low: <54 mg/dL --- 0%    Diet/Exercise: snacks on peanuts or crackers  Fruit and veggie and protein every day, yogurt for breakfast  Aims to exercise 3 days per week. But right now, 1 day per week, walks 4 miles with friends on Sundays    Abstracted 08/08/24        Hypertension   Amlodipine 5mg twice daily- stopped taking AM dose just this week and this helped with orthostatic hypotension  Lisinopril 40mg daily  Patient reports the following medication side effects: dizziness, orthostatic hypotention, fatigue .  Patient self monitors blood pressure.  Home BP monitoring 80/60's .         Today's Vitals: BP 90/64   Wt 147 lb 6.4 oz (66.9 kg)   BMI 25.30 kg/m    ----------------      I spent 30 minutes with this patient today. All changes were made via collaborative practice agreement with Sierra Leonardo MD. A copy of the visit note was provided to the patient's provider(s).    A summary of these recommendations was declined by the patient.    Rabia Mejias, Pharm.D., [facility]  Medication Therapy Management Pharmacist  [phone]          Medication Therapy Recommendations  Hypertension, essential    Current Medication: amLODIPine (NORVASC) 5 MG tablet (Discontinued)   Rationale: Undesirable effect - Adverse medication event - Safety   Recommendation: Discontinue Medication   Status: Accepted per CPA

## 2024-09-04 NOTE — PATIENT INSTRUCTIONS
Recommendations from today's MTM visit:                                                      1. Stop amlodipine completely.    2. We'll give the ozempic 2mg more time -- the nausea may improve over the next few weeks.    Follow-up: Return in 1 month (on 10/4/2024) for Blood sugar recheck, BP Recheck, at 3pm.    It was great to speak with you today.  I value your experience and would be very thankful for your time with providing feedback on our clinic survey. You may receive a survey via email or text message in the next few days.     To schedule another MTM appointment, please call the clinic directly or you may call the scheduling line at [phone].    My Clinical Pharmacist's contact information:                                                      Please feel free to contact me with any questions or concerns you have.      Rabia Mejias, Pharm.D., [facility]  Medication Therapy Management Pharmacist  [phone]    
ADMIT

## 2024-10-15 NOTE — PROGRESS NOTES
Medication Therapy Management (MTM) Encounter    ASSESSMENT:                            Medication Adherence/Access: No issues identified. Timing of meds reviewed in detail.    Diabetes:   Patient is meeting A1c goal of < 7%.   Patient is meeting goal of > 70% time in target with continuous glucose monitoring.   She may benefit from trial of lower dose or taper off metformin to see if we can manage diabetes with Ozempic monotherapy and reduce pill burden    Hypertension   Stable. Patient is meeting blood pressure goal of < 130/80mmHg on low dose amlodipine.      PLAN:                            1. I sent in a prescription for amlodipine 2.5mg one tablet twice a day to the pharmacy.     2. [place] with reducing and/or holding the metformin, and watch for any changes to the blood sugar levels. As long as you are staying in range (70-180mg/dL) more than 70% of the time, you are at goal.    Follow-up: Return in about 5 weeks (around 11/20/2024) for Blood sugar recheck, by phone.    SUBJECTIVE/OBJECTIVE:                          Sonia Montana is a 64 year old female seen for a follow-up visit.      Reason for visit: Medication review.    Allergies/ADRs: Reviewed in chart  Past Medical History: Reviewed in chart  Tobacco: She reports that she has never smoked. She has never used smokeless tobacco.  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    Medication Adherence/Access: no issues reported    Diabetes   Metformin XR 500mg 2 tablets twice daily - reduced to 1 tab   Ozempic 2mg injected once per week on Sunday   Aspirin 325mg daily  No side effects noted    Current diabetes symptoms: none reported, no symptoms of hypoglycemia despite some early AM readings <70mg/dL  Name: Sonia Montana  YOB: 1960  Report Period: 10/02/2024 - 10/15/2024 (14 days)  Generated: 10/15/2024  % Time CGM Active: 62%    Glucose Statistics and Targets  Average Glucose: 112 mg/dL  Glucose  Management Indicator (GMI): 6.0%  Glucose Variability (%CV): 20.8%  Target Range: 70 - 180 mg/dL    Time in Ranges  Very High: >250 mg/dL --- 0%  High: 181 - 250 mg/dL --- 1%  Target Range: 70 - 180 mg/dL --- 98%  Low: 54 - 69 mg/dL --- 1%  Very Low: <54 mg/dL --- 0%      Diet/Exercise: snacks on peanuts or crackers  Fruit and veggie and protein every day, yogurt for breakfast  Aims to exercise 3 days per week. But right now, 1 day per week, walks 4 miles with friends on Sundays    Abstracted 08/08/24        Hypertension   Lisinopril 40mg daily  Amlodipine 5mg 1/2 tablet twice daily - we had stopped last visit, but she found systolic BP going up to 90mmHg  Patient reports the following medication side effects: none - no current issues dizziness, orthostatic hypotention, fatigue .  109/82  Medication History:  Amlodipine 5mg daily- stopped last visit  Patient self monitors blood pressure.  Home BP monitoring 80/60's .         Today's Vitals: /80   Pulse 92   Wt 143 lb 6.4 oz (65 kg)   BMI 24.61 kg/m    ----------------      I spent 30 minutes with this patient today. All changes were made via collaborative practice agreement with Sierra Leonardo MD. A copy of the visit note was provided to the patient's provider(s).    A summary of these recommendations was declined by the patient.    Rabia Mejias, Pharm.D., [facility]  Medication Therapy Management Pharmacist  [phone]          Medication Therapy Recommendations  Type 2 diabetes mellitus without complication, without long-term current use of insulin (H)    Current Medication: metFORMIN (GLUCOPHAGE XR) 500 MG 24 hr tablet   Rationale: Dose too high - Dosage too high - Safety   Recommendation: Decrease Frequency   Status: Accepted per CPA

## 2024-10-16 ENCOUNTER — OFFICE VISIT (OUTPATIENT)
Dept: PHARMACY | Facility: PHYSICIAN GROUP | Age: 64
End: 2024-10-16
Payer: COMMERCIAL

## 2024-10-16 VITALS
HEART RATE: 92 BPM | WEIGHT: 143.4 LBS | SYSTOLIC BLOOD PRESSURE: 112 MMHG | DIASTOLIC BLOOD PRESSURE: 80 MMHG | BODY MASS INDEX: 24.61 KG/M2

## 2024-10-16 DIAGNOSIS — I10 HYPERTENSION, ESSENTIAL: ICD-10-CM

## 2024-10-16 DIAGNOSIS — E11.9 TYPE 2 DIABETES MELLITUS WITHOUT COMPLICATION, WITHOUT LONG-TERM CURRENT USE OF INSULIN (H): Primary | ICD-10-CM

## 2024-10-16 PROCEDURE — 99207 PR NO CHARGE LOS: CPT | Performed by: PHARMACIST

## 2024-10-16 RX ORDER — AMLODIPINE BESYLATE 2.5 MG/1
2.5 TABLET ORAL 2 TIMES DAILY
COMMUNITY

## 2024-10-16 NOTE — PATIENT INSTRUCTIONS
Recommendations from today's MTM visit:                                                      ASSESSMENT:                            Medication Adherence/Access: No issues identified. Timing of meds reviewed in detail.    Diabetes:   Patient is meeting A1c goal of < 7%.   Patient is meeting goal of > 70% time in target with continuous glucose monitoring.   She may benefit from trial of lower dose or taper off metformin to see if we can manage diabetes with Ozempic monotherapy and reduce pill burden    Hypertension   Stable. Patient is meeting blood pressure goal of < 130/80mmHg on low dose amlodipine.      PLAN:                            1. I sent in a prescription for amlodipine 2.5mg one tablet twice a day to the pharmacy.     2. [place] with reducing and/or holding the metformin, and watch for any changes to the blood sugar levels. As long as you are staying in range (70-180mg/dL) more than 70% of the time, you are at goal.    Follow-up: Return in about 5 weeks (around 11/20/2024) for Blood sugar recheck, by phone.    It was great to speak with you today.  I value your experience and would be very thankful for your time with providing feedback on our clinic survey. You may receive a survey via email or text message in the next few days.     To schedule another MTM appointment, please call the clinic directly or you may call the scheduling line at [phone].    My Clinical Pharmacist's contact information:                                                      Please feel free to contact me with any questions or concerns you have.      Rabia Mejias, Pharm.D., BCACP  Medication Therapy Management Pharmacist  [phone]

## 2024-11-20 ENCOUNTER — VIRTUAL VISIT (OUTPATIENT)
Dept: PHARMACY | Facility: PHYSICIAN GROUP | Age: 64
End: 2024-11-20
Payer: COMMERCIAL

## 2024-11-20 DIAGNOSIS — E11.9 TYPE 2 DIABETES MELLITUS WITHOUT COMPLICATION, WITHOUT LONG-TERM CURRENT USE OF INSULIN (H): Primary | ICD-10-CM

## 2024-11-20 DIAGNOSIS — I10 HYPERTENSION, ESSENTIAL: ICD-10-CM

## 2024-11-20 NOTE — PROGRESS NOTES
Medication Therapy Management (MTM) Encounter    ASSESSMENT:                            Medication Adherence/Access: No issues identified. Timing of meds reviewed in detail.    Diabetes:   Patient is meeting A1c goal of < 7%.   Patient is meeting goal of > 70% time in target with continuous glucose monitoring.   She may benefit from trial of lower dose or taper off metformin to see if we can manage diabetes with Ozempic monotherapy and reduce pill burden    Hypertension   Stable. Patient is meeting blood pressure goal of < 130/80mmHg on low dose amlodipine.      PLAN:                            1. [place] with reducing and/or holding the metformin, and watch for any changes to the blood sugar levels. As long as you are staying in range (70-180mg/dL) more than 70% of the time, you are at goal.    Follow-up: Return in about 5 months (around 4/20/2025) for Medication Therapy Management and schedule with Dr. Leonardo in January.    SUBJECTIVE/OBJECTIVE:                          Sonia Montana is a 64 year old female seen for a follow-up visit.      Reason for visit: Medication review.    Allergies/ADRs: Reviewed in chart  Past Medical History: Reviewed in chart  Tobacco: She reports that she has never smoked. She has never used smokeless tobacco.  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    Medication Adherence/Access: no issues reported    Diabetes   Metformin XR 500mg 1 tablets twice daily - has not tried cutting back on the dose or holding; willing to try at this time  Ozempic 2mg injected once per week on Sunday   Aspirin 325mg daily  No side effects noted    Current diabetes symptoms: none reported, no symptoms of hypoglycemia despite some early AM readings <70mg/dL  Name: Sonia Montana  YOB: 1960  Report Period: 11/07/2024 - 11/20/2024 (14 days)  Generated: 11/20/2024  % Time CGM Active: 97%    Glucose Statistics and Targets  Average Glucose: 118 mg/dL  Glucose  Management Indicator (GMI): 6.1%  Glucose Variability (%CV): 20.5%  Target Range: 70 - 180 mg/dL    Time in Ranges  Very High: >250 mg/dL --- 0%  High: 181 - 250 mg/dL --- 2%  Target Range: 70 - 180 mg/dL --- 98%  Low: 54 - 69 mg/dL --- 0%  Very Low: <54 mg/dL --- 0%    Diet/Exercise: snacks on peanuts or crackers  Fruit and veggie and protein every day, yogurt for breakfast  Aims to exercise 3 days per week. But right now, 1 day per week, walks 4 miles with friends on Sundays    Abstracted 08/08/24        Hypertension   Lisinopril 40mg daily  Amlodipine 2.5mg twice daily - we had stopped earlier this year, but she found systolic BP going up to 90mmHg  Patient reports the following medication side effects: none .  109/82  Medication History:  Amlodipine 5mg daily- stopped last visit  Patient self monitors blood pressure.  Home BP monitoring 80/60's .         Today's Vitals: There were no vitals taken for this visit.  ----------------      I spent 8 minutes with this patient today. All changes were made via collaborative practice agreement with Sierra Leonardo MD. A copy of the visit note was provided to the patient's provider(s).    A summary of these recommendations was declined by the patient.    Rabia Mejias, Pharm.D., [facility]  Medication Therapy Management Pharmacist  [phone]     Telemedicine Visit Details  The patient's medications can be safely assessed via a telemedicine encounter.  Type of service:  Telephone visit  Originating Location (pt. Location): Home    Distant Location (provider location):  On-site  Start Time: 11:03 AM  End Time: 11:11 AM     Medication Therapy Recommendations  Type 2 diabetes mellitus without complication, without long-term current use of insulin (H)   1 Current Medication: metFORMIN (GLUCOPHAGE XR) 500 MG 24 hr tablet   Current Medication Sig: Take 1,000 mg by mouth 2 times daily (with meals).   Rationale: Dose too high - Dosage too high - Safety   Recommendation:  Decrease Dose   Status: Accepted per CPA   Identified Date: 11/20/2024 Completed Date: 11/20/2024

## 2024-11-20 NOTE — PATIENT INSTRUCTIONS
Recommendations from today's MTM visit:                                                      ASSESSMENT:                            Medication Adherence/Access: No issues identified. Timing of meds reviewed in detail.    Diabetes:   Patient is meeting A1c goal of < 7%.   Patient is meeting goal of > 70% time in target with continuous glucose monitoring.   She may benefit from trial of lower dose or taper off metformin to see if we can manage diabetes with Ozempic monotherapy and reduce pill burden    Hypertension   Stable. Patient is meeting blood pressure goal of < 130/80mmHg on low dose amlodipine.      PLAN:                            1. [place] with reducing and/or holding the metformin, and watch for any changes to the blood sugar levels. As long as you are staying in range (70-180mg/dL) more than 70% of the time, you are at goal.    Follow-up: Return in about 5 months (around 4/20/2025) for Medication Therapy Management and schedule with Dr. Leonardo in January.    It was great to speak with you today.  I value your experience and would be very thankful for your time with providing feedback on our clinic survey. You may receive a survey via email or text message in the next few days.     To schedule another MTM appointment, please call the clinic directly or you may call the scheduling line at [phone].    My Clinical Pharmacist's contact information:                                                      Please contact the clinic with any questions or concerns you have.      Rabia Mejias, Pharm.D., [facility]  Medication Therapy Management Pharmacist

## 2025-01-01 ENCOUNTER — LAB REQUISITION (OUTPATIENT)
Dept: LAB | Facility: CLINIC | Age: 65
End: 2025-01-01
Payer: COMMERCIAL

## 2025-01-01 DIAGNOSIS — R30.0 DYSURIA: ICD-10-CM

## 2025-01-01 PROCEDURE — 87086 URINE CULTURE/COLONY COUNT: CPT | Mod: ORL | Performed by: STUDENT IN AN ORGANIZED HEALTH CARE EDUCATION/TRAINING PROGRAM

## 2025-01-01 PROCEDURE — 87186 SC STD MICRODIL/AGAR DIL: CPT | Mod: ORL | Performed by: STUDENT IN AN ORGANIZED HEALTH CARE EDUCATION/TRAINING PROGRAM

## 2025-01-03 LAB — BACTERIA UR CULT: ABNORMAL

## 2025-03-14 ENCOUNTER — LAB REQUISITION (OUTPATIENT)
Dept: LAB | Facility: CLINIC | Age: 65
End: 2025-03-14
Payer: COMMERCIAL

## 2025-03-14 ENCOUNTER — TRANSFERRED RECORDS (OUTPATIENT)
Dept: HEALTH INFORMATION MANAGEMENT | Facility: CLINIC | Age: 65
End: 2025-03-14

## 2025-03-14 DIAGNOSIS — E11.9 TYPE 2 DIABETES MELLITUS WITHOUT COMPLICATIONS (H): ICD-10-CM

## 2025-03-14 DIAGNOSIS — Z00.00 ENCOUNTER FOR GENERAL ADULT MEDICAL EXAMINATION WITHOUT ABNORMAL FINDINGS: ICD-10-CM

## 2025-03-14 LAB
CREAT UR-MCNC: 115 MG/DL
HBA1C MFR BLD: 5.9 % (ref 4.2–6.1)
Lab: NORMAL
MICROALBUMIN UR-MCNC: <12 MG/L
MICROALBUMIN/CREAT UR: NORMAL MG/G{CREAT}
PERFORMING LABORATORY: NORMAL
SPECIMEN STATUS: NORMAL
TEST NAME: NORMAL

## 2025-03-14 PROCEDURE — 80061 LIPID PANEL: CPT | Mod: ORL | Performed by: FAMILY MEDICINE

## 2025-03-14 PROCEDURE — 82607 VITAMIN B-12: CPT | Mod: ORL | Performed by: FAMILY MEDICINE

## 2025-03-14 PROCEDURE — 80048 BASIC METABOLIC PNL TOTAL CA: CPT | Mod: ORL | Performed by: FAMILY MEDICINE

## 2025-03-14 PROCEDURE — 82570 ASSAY OF URINE CREATININE: CPT | Mod: ORL | Performed by: FAMILY MEDICINE

## 2025-03-14 PROCEDURE — 82043 UR ALBUMIN QUANTITATIVE: CPT | Mod: ORL | Performed by: FAMILY MEDICINE

## 2025-03-16 LAB
ANION GAP SERPL CALCULATED.3IONS-SCNC: 15 MMOL/L (ref 7–15)
BUN SERPL-MCNC: 9.1 MG/DL (ref 8–23)
CALCIUM SERPL-MCNC: ABNORMAL MG/DL
CHLORIDE SERPL-SCNC: 101 MMOL/L (ref 98–107)
CHOLEST SERPL-MCNC: 108 MG/DL
CREAT SERPL-MCNC: 0.67 MG/DL (ref 0.51–0.95)
EGFRCR SERPLBLD CKD-EPI 2021: >90 ML/MIN/1.73M2
FASTING STATUS PATIENT QL REPORTED: ABNORMAL
FASTING STATUS PATIENT QL REPORTED: ABNORMAL
GLUCOSE SERPL-MCNC: 106 MG/DL (ref 70–99)
HCO3 SERPL-SCNC: 21 MMOL/L (ref 22–29)
HDLC SERPL-MCNC: 43 MG/DL
LDLC SERPL CALC-MCNC: 45 MG/DL
NONHDLC SERPL-MCNC: 65 MG/DL
POTASSIUM SERPL-SCNC: 4.3 MMOL/L (ref 3.4–5.3)
SODIUM SERPL-SCNC: 137 MMOL/L (ref 135–145)
TRIGL SERPL-MCNC: 101 MG/DL

## 2025-03-17 LAB — MISCELLANEOUS TEST 1 (ARUP): NORMAL

## 2025-03-18 LAB — VIT B12 SERPL-MCNC: 596 PG/ML (ref 232–1245)

## 2025-06-25 ENCOUNTER — TELEPHONE (OUTPATIENT)
Dept: PHARMACY | Facility: PHYSICIAN GROUP | Age: 65
End: 2025-06-25
Payer: COMMERCIAL

## 2025-06-25 NOTE — TELEPHONE ENCOUNTER
Portal message sent to patient for MTM follow-up:    My records indicate that we should probably meet again for a follow-up visit. I hope you are doing well. I would like to follow-up for a medication review. I want to continue to help you get the most out of the medicines you are taking.   Please call the MTM appointment line at [phone] to schedule the appointment.   I hope all is well. I look forward to speaking with you soon!    Kind Regards,    Rabia Mejias, Pharm.D., [facility]  Medication Therapy Management Pharmacist

## 2025-08-12 ENCOUNTER — OFFICE VISIT (OUTPATIENT)
Dept: PHARMACY | Facility: PHYSICIAN GROUP | Age: 65
End: 2025-08-12
Payer: COMMERCIAL

## 2025-08-12 VITALS
HEART RATE: 98 BPM | DIASTOLIC BLOOD PRESSURE: 70 MMHG | WEIGHT: 143.4 LBS | BODY MASS INDEX: 24.61 KG/M2 | SYSTOLIC BLOOD PRESSURE: 110 MMHG

## 2025-08-12 DIAGNOSIS — I10 HYPERTENSION, ESSENTIAL: ICD-10-CM

## 2025-08-12 DIAGNOSIS — E11.9 TYPE 2 DIABETES MELLITUS WITHOUT COMPLICATION, WITHOUT LONG-TERM CURRENT USE OF INSULIN (H): Primary | ICD-10-CM

## 2025-08-12 PROCEDURE — 99207 PR NO CHARGE LOS: CPT | Performed by: PHARMACIST
